# Patient Record
(demographics unavailable — no encounter records)

---

## 2025-04-10 NOTE — HISTORY OF PRESENT ILLNESS
[FreeTextEntry1] : Denies Chest Pain, SOB, Dizziness, Leg Edema, Orthopnea, PND, Palpitations, Syncope, REYES, Diaphoresis HTN/Mitral regurgitation - Echo ordered as a routine surveillance (>1yr.) of moderate or severe valvular regurgitation without change in clinical status or cardiac exam (Echo AUC criteria - J.Am Soc of Echo 2011;24;236

## 2025-04-10 NOTE — DISCUSSION/SUMMARY
[Hyperlipidemia] : hyperlipidemia [Diet Modification] : diet modification [Hypertension] : hypertension [Low Sodium Diet] : low sodium diet [NSAIDs Avoidance] : non-steroidal anti-inflammatory drugs avoidance [Mitral Regurgitation] : mitral regurgitation [Stable] : stable [None] : There are no changes in medication management [Sodium Restriction] : sodium restriction [Patient] : the patient [FreeTextEntry1] : Carotid artery disease - Stable; no further intervention at this time. Blood work reviewed with PMD. Maintain a low caloric diet, low sodium, low cholesterol diet and exercise discussed in detail.

## 2025-04-10 NOTE — PHYSICAL EXAM

## 2025-04-10 NOTE — END OF VISIT
[FreeTextEntry3] :  All medical records entered made by the scribe were at my Dr. Juanjose Medina, discretion and personally dictated by me on Apr 10 2025 12:40PM. I have reviewed the chart and agree that the record accuracy reflects my personal performance of the history, physical exam, assessment and plan. I have also personally directed, reviewed and agreed with the chart. [Time Spent: ___ minutes] : I have spent [unfilled] minutes of time on the encounter which excludes teaching and separately reported services.